# Patient Record
Sex: MALE | Employment: FULL TIME | URBAN - METROPOLITAN AREA
[De-identification: names, ages, dates, MRNs, and addresses within clinical notes are randomized per-mention and may not be internally consistent; named-entity substitution may affect disease eponyms.]

---

## 2024-10-27 ENCOUNTER — HOSPITAL ENCOUNTER (EMERGENCY)
Facility: HOSPITAL | Age: 19
End: 2024-10-28
Attending: EMERGENCY MEDICINE
Payer: COMMERCIAL

## 2024-10-27 DIAGNOSIS — Z00.8 MEDICAL CLEARANCE FOR PSYCHIATRIC ADMISSION: ICD-10-CM

## 2024-10-27 DIAGNOSIS — R45.850 HOMICIDAL IDEATION: Primary | ICD-10-CM

## 2024-10-27 LAB
ALBUMIN SERPL BCG-MCNC: 4.4 G/DL (ref 3.5–5)
ALP SERPL-CCNC: 94 U/L (ref 34–104)
ALT SERPL W P-5'-P-CCNC: 14 U/L (ref 7–52)
AMPHETAMINES SERPL QL SCN: NEGATIVE
ANION GAP SERPL CALCULATED.3IONS-SCNC: 6 MMOL/L (ref 4–13)
APAP SERPL-MCNC: <2 UG/ML (ref 10–20)
AST SERPL W P-5'-P-CCNC: 20 U/L (ref 13–39)
BARBITURATES UR QL: NEGATIVE
BASOPHILS # BLD AUTO: 0.03 THOUSANDS/ÂΜL (ref 0–0.1)
BASOPHILS NFR BLD AUTO: 0 % (ref 0–1)
BENZODIAZ UR QL: NEGATIVE
BILIRUB SERPL-MCNC: 1.28 MG/DL (ref 0.2–1)
BUN SERPL-MCNC: 10 MG/DL (ref 5–25)
CALCIUM SERPL-MCNC: 9.8 MG/DL (ref 8.4–10.2)
CHLORIDE SERPL-SCNC: 104 MMOL/L (ref 96–108)
CO2 SERPL-SCNC: 29 MMOL/L (ref 21–32)
COCAINE UR QL: NEGATIVE
CREAT SERPL-MCNC: 0.9 MG/DL (ref 0.6–1.3)
EOSINOPHIL # BLD AUTO: 0.11 THOUSAND/ÂΜL (ref 0–0.61)
EOSINOPHIL NFR BLD AUTO: 1 % (ref 0–6)
ERYTHROCYTE [DISTWIDTH] IN BLOOD BY AUTOMATED COUNT: 15.1 % (ref 11.6–15.1)
ETHANOL SERPL-MCNC: <10 MG/DL
FENTANYL UR QL SCN: NEGATIVE
GFR SERPL CREATININE-BSD FRML MDRD: 121 ML/MIN/1.73SQ M
GLUCOSE SERPL-MCNC: 109 MG/DL (ref 65–140)
HCT VFR BLD AUTO: 37.9 % (ref 36.5–49.3)
HGB BLD-MCNC: 13.6 G/DL (ref 12–17)
HYDROCODONE UR QL SCN: NEGATIVE
IMM GRANULOCYTES # BLD AUTO: 0.03 THOUSAND/UL (ref 0–0.2)
IMM GRANULOCYTES NFR BLD AUTO: 0 % (ref 0–2)
LYMPHOCYTES # BLD AUTO: 1.41 THOUSANDS/ÂΜL (ref 0.6–4.47)
LYMPHOCYTES NFR BLD AUTO: 17 % (ref 14–44)
MCH RBC QN AUTO: 29.9 PG (ref 26.8–34.3)
MCHC RBC AUTO-ENTMCNC: 35.9 G/DL (ref 31.4–37.4)
MCV RBC AUTO: 83 FL (ref 82–98)
METHADONE UR QL: NEGATIVE
MONOCYTES # BLD AUTO: 0.77 THOUSAND/ÂΜL (ref 0.17–1.22)
MONOCYTES NFR BLD AUTO: 9 % (ref 4–12)
NEUTROPHILS # BLD AUTO: 5.96 THOUSANDS/ÂΜL (ref 1.85–7.62)
NEUTS SEG NFR BLD AUTO: 73 % (ref 43–75)
NRBC BLD AUTO-RTO: 0 /100 WBCS
OPIATES UR QL SCN: NEGATIVE
OXYCODONE+OXYMORPHONE UR QL SCN: NEGATIVE
PCP UR QL: NEGATIVE
PLATELET # BLD AUTO: 129 THOUSANDS/UL (ref 149–390)
POTASSIUM SERPL-SCNC: 3.6 MMOL/L (ref 3.5–5.3)
PROT SERPL-MCNC: 6.7 G/DL (ref 6.4–8.4)
RBC # BLD AUTO: 4.55 MILLION/UL (ref 3.88–5.62)
SALICYLATES SERPL-MCNC: <5 MG/DL (ref 3–20)
SODIUM SERPL-SCNC: 139 MMOL/L (ref 135–147)
THC UR QL: NEGATIVE
WBC # BLD AUTO: 8.31 THOUSAND/UL (ref 4.31–10.16)

## 2024-10-27 PROCEDURE — 99285 EMERGENCY DEPT VISIT HI MDM: CPT

## 2024-10-27 PROCEDURE — 36415 COLL VENOUS BLD VENIPUNCTURE: CPT | Performed by: EMERGENCY MEDICINE

## 2024-10-27 PROCEDURE — 99285 EMERGENCY DEPT VISIT HI MDM: CPT | Performed by: EMERGENCY MEDICINE

## 2024-10-27 PROCEDURE — 80061 LIPID PANEL: CPT | Performed by: PSYCHIATRY & NEUROLOGY

## 2024-10-27 PROCEDURE — 84443 ASSAY THYROID STIM HORMONE: CPT | Performed by: PSYCHIATRY & NEUROLOGY

## 2024-10-27 PROCEDURE — 83036 HEMOGLOBIN GLYCOSYLATED A1C: CPT | Performed by: PSYCHIATRY & NEUROLOGY

## 2024-10-27 PROCEDURE — 80143 DRUG ASSAY ACETAMINOPHEN: CPT | Performed by: EMERGENCY MEDICINE

## 2024-10-27 PROCEDURE — 80307 DRUG TEST PRSMV CHEM ANLYZR: CPT | Performed by: EMERGENCY MEDICINE

## 2024-10-27 PROCEDURE — 80053 COMPREHEN METABOLIC PANEL: CPT | Performed by: EMERGENCY MEDICINE

## 2024-10-27 PROCEDURE — 80179 DRUG ASSAY SALICYLATE: CPT | Performed by: EMERGENCY MEDICINE

## 2024-10-27 PROCEDURE — 82077 ASSAY SPEC XCP UR&BREATH IA: CPT | Performed by: EMERGENCY MEDICINE

## 2024-10-27 PROCEDURE — 85025 COMPLETE CBC W/AUTO DIFF WBC: CPT | Performed by: EMERGENCY MEDICINE

## 2024-10-27 NOTE — LETTER
[facility] EMERGENCY DEPARTMENT  [address] [address]  Dept: [phone]      EMTALA TRANSFER CONSENT    NAME Cortez Du                                         2005                              MRN [id number]    I have been informed of my rights regarding examination, treatment, and transfer   by Dr. Isma Huff DO    Benefits: Specialized equipment and/or services available at the receiving facility (Include comment)________________________, Continuity of care, Other benefits (Include comment)_______________________ (inpatient mental health 201)    Risks: Potential for delay in receiving treatment, Potential deterioration of medical condition, Possible worsening of condition or death during transfer, Increased discomfort during transfer        Consent for Transfer:  I acknowledge that my medical condition has been evaluated and explained to me by the emergency department physician or other qualified medical person and/or my attending physician, who has recommended that I be transferred to the service of  Accepting Physician: Dr. Rogers at Accepting Facility Name, City & State : \[facility]\"" 2. The above potential benefits of such transfer, the potential risks associated with such transfer, and the probable risks of not being transferred have been explained to me, and I fully understand them.  The doctor has explained that, in my case, the benefits of transfer outweigh the risks.  I agree to be transferred.      I authorize the performance of emergency medical procedures and treatments upon me in both transit and upon arrival at the receiving facility.  Additionally, I authorize the release of any and all medical records to the receiving facility and request they be transported with me, if possible.  I understand that the safest mode of transportation during a medical emergency is an ambulance and that the Hospital advocates the use of this mode of  transport. Risks of traveling to the receiving facility by car, including absence of medical control, life sustaining equipment, such as oxygen, and medical personnel has been explained to me and I fully understand them.    (REINALDO CORRECT BOX BELOW)  [  ]  I consent to the stated transfer and to be transported by ambulance/helicopter.  [  ]  I consent to the stated transfer, but refuse transportation by ambulance and accept full responsibility for my transportation by car.  I understand the risks of non-ambulance transfers and I exonerate the Hospital and its staff from any deterioration in my condition that results from this refusal.    X___________________________________________    DATE  10/28/24  TIME________  Signature of patient or legally responsible individual signing on patient behalf           RELATIONSHIP TO PATIENT_________________________          Provider Certification    NAME Cortez Du                                        [facility] 2005                              MRN [id number]    A medical screening exam was performed on the above named patient.  Based on the examination:    Condition Necessitating Transfer The primary encounter diagnosis was Homicidal ideation. A diagnosis of Medical clearance for psychiatric admission was also pertinent to this visit.    Patient Condition: The patient has been stabilized such that within reasonable medical probability, no material deterioration of the patient condition or the condition of the unborn child(suyapa) is likely to result from the transfer    Reason for Transfer: Level of Care needed not available at this facility, No bed available at level of patient's needs, Other (Include comment)____________________ (inpatient mental health 201)    Transfer Requirements: Facility [facility] 2W   Space available and qualified personnel available for treatment as acknowledged by Crisis  Agreed to accept transfer and to provide appropriate medical treatment as acknowledged  by       Dr. Rogers  Appropriate medical records of the examination and treatment of the patient are provided at the time of transfer   STAFF INITIAL WHEN COMPLETED _______  Transfer will be performed by qualified personnel from [facility]  and appropriate transfer equipment as required, including the use of necessary and appropriate life support measures.    Provider Certification: I have examined the patient and explained the following risks and benefits of being transferred/refusing transfer to the patient/family:  General risk, such as traffic hazards, adverse weather conditions, rough terrain or turbulence, possible failure of equipment (including vehicle or aircraft), or consequences of actions of persons outside the control of the transport personnel, The possibility of a transport vehicle being unavailable, Unanticipated needs of medical equipment and personnel during transport, Risk of worsening condition, The patient is stable for psychiatric transfer because they are medically stable, and is protected from harming him/herself or others during transport      Based on these reasonable risks and benefits to the patient and/or the unborn child(suyapa), and based upon the information available at the time of the patient’s examination, I certify that the medical benefits reasonably to be expected from the provision of appropriate medical treatments at another medical facility outweigh the increasing risks, if any, to the individual’s medical condition, and in the case of labor to the unborn child, from effecting the transfer.    X____________________________________________ DATE 10/28/24        TIME_______      ORIGINAL - SEND TO MEDICAL RECORDS   COPY - SEND WITH PATIENT DURING TRANSFER

## 2024-10-28 ENCOUNTER — HOSPITAL ENCOUNTER (INPATIENT)
Facility: HOSPITAL | Age: 19
LOS: 4 days | Discharge: HOME | End: 2024-11-01
Attending: STUDENT IN AN ORGANIZED HEALTH CARE EDUCATION/TRAINING PROGRAM | Admitting: STUDENT IN AN ORGANIZED HEALTH CARE EDUCATION/TRAINING PROGRAM
Payer: COMMERCIAL

## 2024-10-28 VITALS
DIASTOLIC BLOOD PRESSURE: 69 MMHG | OXYGEN SATURATION: 98 % | WEIGHT: 138 LBS | BODY MASS INDEX: 22.99 KG/M2 | TEMPERATURE: 98.1 F | SYSTOLIC BLOOD PRESSURE: 123 MMHG | HEIGHT: 65 IN | RESPIRATION RATE: 18 BRPM | HEART RATE: 62 BPM

## 2024-10-28 DIAGNOSIS — Z00.8 MEDICAL CLEARANCE FOR PSYCHIATRIC ADMISSION: ICD-10-CM

## 2024-10-28 DIAGNOSIS — F60.9 PERSONALITY DISORDER, UNSPECIFIED (HCC): ICD-10-CM

## 2024-10-28 DIAGNOSIS — R45.850 HOMICIDAL IDEATION: Primary | ICD-10-CM

## 2024-10-28 LAB
EST. AVERAGE GLUCOSE BLD GHB EST-MCNC: 74 MG/DL
HBA1C MFR BLD: 4.2 %
TSH SERPL DL<=0.05 MIU/L-ACNC: 4.82 UIU/ML (ref 0.45–4.5)

## 2024-10-28 RX ORDER — BENZTROPINE MESYLATE 1 MG/1
1 TABLET ORAL
Status: DISCONTINUED | OUTPATIENT
Start: 2024-10-28 | End: 2024-11-01 | Stop reason: HOSPADM

## 2024-10-28 RX ORDER — OLANZAPINE 10 MG/2ML
2.5 INJECTION, POWDER, FOR SOLUTION INTRAMUSCULAR
Status: DISCONTINUED | OUTPATIENT
Start: 2024-10-28 | End: 2024-11-01 | Stop reason: HOSPADM

## 2024-10-28 RX ORDER — BISACODYL 10 MG
10 SUPPOSITORY, RECTAL RECTAL DAILY PRN
Status: CANCELLED | OUTPATIENT
Start: 2024-10-28

## 2024-10-28 RX ORDER — AMOXICILLIN 250 MG
1 CAPSULE ORAL DAILY PRN
Status: DISCONTINUED | OUTPATIENT
Start: 2024-10-28 | End: 2024-11-01 | Stop reason: HOSPADM

## 2024-10-28 RX ORDER — BISACODYL 10 MG
10 SUPPOSITORY, RECTAL RECTAL DAILY PRN
Status: DISCONTINUED | OUTPATIENT
Start: 2024-10-28 | End: 2024-11-01 | Stop reason: HOSPADM

## 2024-10-28 RX ORDER — BENZTROPINE MESYLATE 1 MG/ML
1 INJECTION, SOLUTION INTRAMUSCULAR; INTRAVENOUS
Status: DISCONTINUED | OUTPATIENT
Start: 2024-10-28 | End: 2024-11-01 | Stop reason: HOSPADM

## 2024-10-28 RX ORDER — AMOXICILLIN 250 MG
1 CAPSULE ORAL DAILY PRN
Status: CANCELLED | OUTPATIENT
Start: 2024-10-28

## 2024-10-28 RX ORDER — OLANZAPINE 2.5 MG/1
2.5 TABLET, FILM COATED ORAL
Status: CANCELLED | OUTPATIENT
Start: 2024-10-28

## 2024-10-28 RX ORDER — OLANZAPINE 5 MG/1
5 TABLET ORAL
Status: CANCELLED | OUTPATIENT
Start: 2024-10-28

## 2024-10-28 RX ORDER — HYDROXYZINE HYDROCHLORIDE 25 MG/1
100 TABLET, FILM COATED ORAL
Status: CANCELLED | OUTPATIENT
Start: 2024-10-28

## 2024-10-28 RX ORDER — MAGNESIUM HYDROXIDE/ALUMINUM HYDROXICE/SIMETHICONE 120; 1200; 1200 MG/30ML; MG/30ML; MG/30ML
30 SUSPENSION ORAL EVERY 4 HOURS PRN
Status: CANCELLED | OUTPATIENT
Start: 2024-10-28

## 2024-10-28 RX ORDER — ACETAMINOPHEN 325 MG/1
975 TABLET ORAL EVERY 6 HOURS PRN
Status: DISCONTINUED | OUTPATIENT
Start: 2024-10-28 | End: 2024-11-01 | Stop reason: HOSPADM

## 2024-10-28 RX ORDER — HYDROXYZINE HYDROCHLORIDE 50 MG/1
100 TABLET, FILM COATED ORAL
Status: DISCONTINUED | OUTPATIENT
Start: 2024-10-28 | End: 2024-11-01 | Stop reason: HOSPADM

## 2024-10-28 RX ORDER — POLYETHYLENE GLYCOL 3350 17 G/17G
17 POWDER, FOR SOLUTION ORAL DAILY PRN
Status: DISCONTINUED | OUTPATIENT
Start: 2024-10-28 | End: 2024-11-01 | Stop reason: HOSPADM

## 2024-10-28 RX ORDER — OLANZAPINE 10 MG/2ML
5 INJECTION, POWDER, FOR SOLUTION INTRAMUSCULAR
Status: DISCONTINUED | OUTPATIENT
Start: 2024-10-28 | End: 2024-11-01 | Stop reason: HOSPADM

## 2024-10-28 RX ORDER — OLANZAPINE 10 MG/2ML
2.5 INJECTION, POWDER, FOR SOLUTION INTRAMUSCULAR
Status: CANCELLED | OUTPATIENT
Start: 2024-10-28

## 2024-10-28 RX ORDER — PROPRANOLOL HCL 20 MG
10 TABLET ORAL EVERY 8 HOURS PRN
Status: CANCELLED | OUTPATIENT
Start: 2024-10-28

## 2024-10-28 RX ORDER — POLYETHYLENE GLYCOL 3350 17 G/17G
17 POWDER, FOR SOLUTION ORAL DAILY PRN
Status: CANCELLED | OUTPATIENT
Start: 2024-10-28

## 2024-10-28 RX ORDER — HYDROXYZINE HYDROCHLORIDE 50 MG/1
50 TABLET, FILM COATED ORAL
Status: DISCONTINUED | OUTPATIENT
Start: 2024-10-28 | End: 2024-11-01 | Stop reason: HOSPADM

## 2024-10-28 RX ORDER — PROPRANOLOL HYDROCHLORIDE 10 MG/1
10 TABLET ORAL EVERY 8 HOURS PRN
Status: DISCONTINUED | OUTPATIENT
Start: 2024-10-28 | End: 2024-11-01 | Stop reason: HOSPADM

## 2024-10-28 RX ORDER — OLANZAPINE 5 MG/1
5 TABLET ORAL
Status: DISCONTINUED | OUTPATIENT
Start: 2024-10-28 | End: 2024-11-01 | Stop reason: HOSPADM

## 2024-10-28 RX ORDER — LORAZEPAM 2 MG/ML
2 INJECTION INTRAMUSCULAR EVERY 6 HOURS PRN
Status: DISCONTINUED | OUTPATIENT
Start: 2024-10-28 | End: 2024-11-01 | Stop reason: HOSPADM

## 2024-10-28 RX ORDER — ACETAMINOPHEN 325 MG/1
975 TABLET ORAL EVERY 6 HOURS PRN
Status: CANCELLED | OUTPATIENT
Start: 2024-10-28

## 2024-10-28 RX ORDER — OLANZAPINE 2.5 MG/1
2.5 TABLET, FILM COATED ORAL
Status: DISCONTINUED | OUTPATIENT
Start: 2024-10-28 | End: 2024-11-01 | Stop reason: HOSPADM

## 2024-10-28 RX ORDER — HYDROXYZINE HYDROCHLORIDE 25 MG/1
25 TABLET, FILM COATED ORAL
Status: DISCONTINUED | OUTPATIENT
Start: 2024-10-28 | End: 2024-11-01 | Stop reason: HOSPADM

## 2024-10-28 RX ORDER — HYDROXYZINE HYDROCHLORIDE 25 MG/1
50 TABLET, FILM COATED ORAL
Status: CANCELLED | OUTPATIENT
Start: 2024-10-28

## 2024-10-28 RX ORDER — LANOLIN ALCOHOL/MO/W.PET/CERES
3 CREAM (GRAM) TOPICAL
Status: DISCONTINUED | OUTPATIENT
Start: 2024-10-28 | End: 2024-11-01 | Stop reason: HOSPADM

## 2024-10-28 RX ORDER — DIPHENHYDRAMINE HYDROCHLORIDE 50 MG/ML
50 INJECTION INTRAMUSCULAR; INTRAVENOUS EVERY 6 HOURS PRN
Status: DISCONTINUED | OUTPATIENT
Start: 2024-10-28 | End: 2024-11-01 | Stop reason: HOSPADM

## 2024-10-28 RX ORDER — BENZTROPINE MESYLATE 1 MG/ML
1 INJECTION, SOLUTION INTRAMUSCULAR; INTRAVENOUS
Status: CANCELLED | OUTPATIENT
Start: 2024-10-28

## 2024-10-28 RX ORDER — OLANZAPINE 10 MG/2ML
5 INJECTION, POWDER, FOR SOLUTION INTRAMUSCULAR
Status: CANCELLED | OUTPATIENT
Start: 2024-10-28

## 2024-10-28 RX ORDER — MAGNESIUM HYDROXIDE/ALUMINUM HYDROXICE/SIMETHICONE 120; 1200; 1200 MG/30ML; MG/30ML; MG/30ML
30 SUSPENSION ORAL EVERY 4 HOURS PRN
Status: DISCONTINUED | OUTPATIENT
Start: 2024-10-28 | End: 2024-11-01 | Stop reason: HOSPADM

## 2024-10-28 RX ORDER — DIPHENHYDRAMINE HYDROCHLORIDE 50 MG/ML
50 INJECTION INTRAMUSCULAR; INTRAVENOUS EVERY 6 HOURS PRN
Status: CANCELLED | OUTPATIENT
Start: 2024-10-28

## 2024-10-28 RX ORDER — ACETAMINOPHEN 325 MG/1
650 TABLET ORAL EVERY 6 HOURS PRN
Status: DISCONTINUED | OUTPATIENT
Start: 2024-10-28 | End: 2024-11-01 | Stop reason: HOSPADM

## 2024-10-28 RX ORDER — ACETAMINOPHEN 325 MG/1
650 TABLET ORAL EVERY 4 HOURS PRN
Status: CANCELLED | OUTPATIENT
Start: 2024-10-28

## 2024-10-28 RX ORDER — HYDROXYZINE HYDROCHLORIDE 25 MG/1
25 TABLET, FILM COATED ORAL
Status: CANCELLED | OUTPATIENT
Start: 2024-10-28

## 2024-10-28 RX ORDER — ACETAMINOPHEN 325 MG/1
650 TABLET ORAL EVERY 6 HOURS PRN
Status: CANCELLED | OUTPATIENT
Start: 2024-10-28

## 2024-10-28 RX ORDER — ACETAMINOPHEN 325 MG/1
650 TABLET ORAL EVERY 4 HOURS PRN
Status: DISCONTINUED | OUTPATIENT
Start: 2024-10-28 | End: 2024-11-01 | Stop reason: HOSPADM

## 2024-10-28 RX ORDER — BENZTROPINE MESYLATE 0.5 MG/1
1 TABLET ORAL
Status: CANCELLED | OUTPATIENT
Start: 2024-10-28

## 2024-10-28 RX ORDER — LORAZEPAM 2 MG/ML
2 INJECTION INTRAMUSCULAR EVERY 6 HOURS PRN
Status: CANCELLED | OUTPATIENT
Start: 2024-10-28

## 2024-10-28 NOTE — ED NOTES
Patient changed into paper scrubs and belongings removed. Belongings placed in  locker 1.      Petrona Soliman RN  10/27/24 7892

## 2024-10-28 NOTE — ED NOTES
Confirmed receipt of referral by Intake.  Updated [facility].  Awaiting bed availability at present.

## 2024-10-28 NOTE — ED NOTES
Call to [facility].  Phone: [phone].  Spoke with Yokasta MAHER, who verified effective 8/15/2024, but added that member has no mental health coverage or benefits under this plan.  Reference # for the call: [id number].    Patient appears to have no mental health coverage.

## 2024-10-28 NOTE — PROGRESS NOTES
-Camo Pants w/ strings  -Vans  -Black Jacket  -Black T-shirt  -Underwear  -Socks  -Cellphone  -Metal wallet  -$23.00  -[place] ([id number])  -(2) [employer] ([id number]  -[place] ID ([id number])  -[employer] ID ([id number])  -[employer] ([id number])  -[employer] ([id number])  -Firearm License  -[employer] ([id number])  -(2) [facility] ([id number])    Bedside Belongings  -Black T-shirt  -Socks  -Underwear

## 2024-10-28 NOTE — ED NOTES
Insurance Authorization for admission:   Primary payor is [employer].  Attempts were made to gain provider access for online submission, but denied.  3 forms were located (one general authorization request, one [facility], and one [facility]).  All 3 forms completed and submitted with clinical via fax as the forms indicated to: [phone] and [phone].  Successful transmission confirmed via fax generated report.  Forms, confirmation and clinical that was attached all included in transfer packet for UR referencing as needed.  Days approved: TBD.  Level of care: inpatient mental health 201.  Review on: TBD.   Authorization #: [id number].
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
## 2024-10-28 NOTE — ED NOTES
Patient was advised of final disposition and plan and voiced understanding.  Patient signed EMTALA.  Requested a call to his mother for update.  Call to mother, who was provided unit details and voiced understanding.  [facility] also updated.

## 2024-10-28 NOTE — ED NOTES
"Crisis received a fax from [employer] indicating that they were \"unable to process this request due to the beneficiary being ineligible.\"  Number indicated on the form for contact: [phone].  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  He also stated that if patient has other health insurance, they would be the secondary payor, and suggested seeking prior authorization with [employer], as he is currently ineligible for [employer].  He offered a control # for the call: [id number].    There is no [employer] card on file and no ID# indicated in the chart / facesheet.  Crisis to inquire with patient.  "

## 2024-10-28 NOTE — ED NOTES
Per chart, patient indicated having [employer] card in his possessions.  Card accessed in his wallet, copied, and provided to Registration. Insurance was run multiple times and returned as E-rejected every time.

## 2024-10-28 NOTE — ED NOTES
Patient is accepted at [facility] 2W.  Patient is accepted by Dr. Rogers with orders by Nerissa Hills per Kenna.     Transportation is arranged with [facility].     Transportation is scheduled for 1230 with [facility].  Intake updated.   Patient may go to the floor pending requested  time of 1230 or later.      Transfer packet prepared and will be on chart with copies once signed and complete.        Nurse report is to be called to [phone] prior to patient transfer.

## 2024-10-28 NOTE — ED NOTES
Crisis met with patient to discuss placement options.  Patient has no admission history.  After review of options in brief, he did express preference to wait for network bed availability before expanding the bed search.  Crisis to inquire on bed availability with Intake later this morning.

## 2024-10-28 NOTE — PLAN OF CARE
Problem: DEPRESSION  Goal: Will be euthymic at discharge  Description: INTERVENTIONS:  - Administer medication as ordered  - Provide emotional support via 1:1 interaction with staff  - Encourage involvement in milieu/groups/activities  - Monitor for social isolation  Outcome: Progressing     Problem: BEHAVIOR  Goal: Pt/Family maintain appropriate behavior and adhere to behavioral management agreement, if implemented  Description: INTERVENTIONS:  - Assess the family dynamic   - Encourage verbalization of thoughts and concerns in a socially appropriate manner  - Assess patient/family's coping skills and non-compliant behavior (including use of illegal substances).  - Utilize positive, consistent limit setting strategies supporting safety of patient, staff and others  - Initiate consult with Case Management, Spiritual Care or other ancillary services as appropriate  - If a patient's/visitor's behavior jeopardizes the safety of the patient, staff, or others, refer to organization procedure.   - Notify Security of behavior or suspected illegal substances which indicate the need for search of the patient and/or belongings  - Encourage participation in the decision making process about a behavioral management agreement; implement if patient meets criteria  Outcome: Progressing     Problem: ANXIETY  Goal: Will report anxiety at manageable levels  Description: INTERVENTIONS:  - Administer medication as ordered  - Teach and encourage coping skills  - Provide emotional support  - Assess patient/family for anxiety and ability to cope  Outcome: Progressing  Goal: By discharge: Patient will verbalize 2 strategies to deal with anxiety  Description: Interventions:  - Identify any obvious source/trigger to anxiety  - Staff will assist patient in applying identified coping technique/skills  - Encourage attendance of scheduled groups and activities  Outcome: Progressing

## 2024-10-29 PROBLEM — R45.850 HOMICIDAL IDEATION: Status: ACTIVE | Noted: 2024-10-29

## 2024-10-29 PROBLEM — F60.9 PERSONALITY DISORDER, UNSPECIFIED (HCC): Status: ACTIVE | Noted: 2024-10-29

## 2024-10-29 PROBLEM — Z00.8 MEDICAL CLEARANCE FOR PSYCHIATRIC ADMISSION: Status: ACTIVE | Noted: 2024-10-29

## 2024-10-29 PROBLEM — R79.89 ELEVATED TSH: Status: ACTIVE | Noted: 2024-10-29

## 2024-10-29 LAB
25(OH)D3 SERPL-MCNC: 35 NG/ML (ref 30–100)
ANISOCYTOSIS BLD QL SMEAR: PRESENT
ATRIAL RATE: 51 BPM
BASOPHILS # BLD MANUAL: 0 THOUSAND/UL (ref 0–0.1)
BASOPHILS NFR MAR MANUAL: 0 % (ref 0–1)
CHOLEST SERPL-MCNC: 109 MG/DL
EOSINOPHIL # BLD MANUAL: 0.19 THOUSAND/UL (ref 0–0.4)
EOSINOPHIL NFR BLD MANUAL: 3 % (ref 0–6)
ERYTHROCYTE [DISTWIDTH] IN BLOOD BY AUTOMATED COUNT: 15.5 % (ref 11.6–15.1)
FOLATE SERPL-MCNC: 13.5 NG/ML
HCT VFR BLD AUTO: 39.4 % (ref 36.5–49.3)
HDLC SERPL-MCNC: 41 MG/DL
HGB BLD-MCNC: 13.7 G/DL (ref 12–17)
LDLC SERPL CALC-MCNC: 60 MG/DL (ref 0–100)
LYMPHOCYTES # BLD AUTO: 2.23 THOUSAND/UL (ref 0.6–4.47)
LYMPHOCYTES # BLD AUTO: 36 % (ref 14–44)
MCH RBC QN AUTO: 29.8 PG (ref 26.8–34.3)
MCHC RBC AUTO-ENTMCNC: 34.8 G/DL (ref 31.4–37.4)
MCV RBC AUTO: 86 FL (ref 82–98)
MONOCYTES # BLD AUTO: 0.25 THOUSAND/UL (ref 0–1.22)
MONOCYTES NFR BLD: 4 % (ref 4–12)
NEUTROPHILS # BLD MANUAL: 3.53 THOUSAND/UL (ref 1.85–7.62)
NEUTS SEG NFR BLD AUTO: 57 % (ref 43–75)
NONHDLC SERPL-MCNC: 68 MG/DL
P AXIS: 40 DEGREES
PLATELET # BLD AUTO: 117 THOUSANDS/UL (ref 149–390)
PLATELET BLD QL SMEAR: ABNORMAL
PMV BLD AUTO: 13.9 FL (ref 8.9–12.7)
PR INTERVAL: 120 MS
QRS AXIS: 98 DEGREES
QRSD INTERVAL: 92 MS
QT INTERVAL: 408 MS
QTC INTERVAL: 376 MS
RBC # BLD AUTO: 4.6 MILLION/UL (ref 3.88–5.62)
RBC MORPH BLD: PRESENT
T WAVE AXIS: 34 DEGREES
T4 FREE SERPL-MCNC: 0.8 NG/DL (ref 0.61–1.12)
TRIGL SERPL-MCNC: 39 MG/DL
VENTRICULAR RATE: 51 BPM
VIT B12 SERPL-MCNC: 356 PG/ML (ref 180–914)
WBC # BLD AUTO: 6.19 THOUSAND/UL (ref 4.31–10.16)

## 2024-10-29 PROCEDURE — 85007 BL SMEAR W/DIFF WBC COUNT: CPT | Performed by: PSYCHIATRY & NEUROLOGY

## 2024-10-29 PROCEDURE — 99223 1ST HOSP IP/OBS HIGH 75: CPT | Performed by: PSYCHIATRY & NEUROLOGY

## 2024-10-29 PROCEDURE — 99253 IP/OBS CNSLTJ NEW/EST LOW 45: CPT

## 2024-10-29 PROCEDURE — 82306 VITAMIN D 25 HYDROXY: CPT | Performed by: STUDENT IN AN ORGANIZED HEALTH CARE EDUCATION/TRAINING PROGRAM

## 2024-10-29 PROCEDURE — 93010 ELECTROCARDIOGRAM REPORT: CPT | Performed by: INTERNAL MEDICINE

## 2024-10-29 PROCEDURE — 82746 ASSAY OF FOLIC ACID SERUM: CPT | Performed by: STUDENT IN AN ORGANIZED HEALTH CARE EDUCATION/TRAINING PROGRAM

## 2024-10-29 PROCEDURE — 93005 ELECTROCARDIOGRAM TRACING: CPT

## 2024-10-29 PROCEDURE — 84439 ASSAY OF FREE THYROXINE: CPT | Performed by: STUDENT IN AN ORGANIZED HEALTH CARE EDUCATION/TRAINING PROGRAM

## 2024-10-29 PROCEDURE — 85027 COMPLETE CBC AUTOMATED: CPT | Performed by: PSYCHIATRY & NEUROLOGY

## 2024-10-29 PROCEDURE — 82607 VITAMIN B-12: CPT | Performed by: STUDENT IN AN ORGANIZED HEALTH CARE EDUCATION/TRAINING PROGRAM

## 2024-10-29 NOTE — CONSULTS
Consultation - Hospitalist   Name: Cortez Du 19 y.o. male I MRN: [id number]  Unit/Bed#: U 211-02 I Date of Admission: 10/28/2024   Date of Service: 10/29/2024 I Hospital Day: 1   Inpatient consult for Medical Clearance for  patient  Consult performed by: Shannon Montenegro PA-C  Consult ordered by: Nerissa Hills PA-C        Physician Requesting Evaluation: Barbie Bailey*   Reason for Evaluation / Principal Problem: Medical clearance for psychiatric admission     Assessment & Plan  Medical clearance for psychiatric admission  Admission labs reviewed, CBC, CMP, lipid panel acceptable   Vitamin levels, T4, pending  Vitals stable   UDS negative  EKG: NSR,   Medically stable for continued inpatient psychiatric treatment based on available results   Elevated TSH  TSH elevated at 4.816  T4 pending  Repeat levels with PCP in 4-6 weeks outpatient   No hx of thyroid dx      Counseling / Coordination of Care Time: 30 minutes.  Greater than 50% of total time spent on patient counseling and coordination of care.    Collaboration of Care: Were Recommendations Directly Discussed with Primary Treatment Team? - No     History of Present Illness:    Cortez Du is a 19 y.o. male without significant PMH who is originally admitted to the psychiatry service due to 302 filed after patient reported thoughts of HI. We are consulted for medical clearance for admission to Behavioral Health Unit and treatment of underlying psychiatric illness.      Patient denies any substance use. He does not take medications and denies known medical hx. He currently denies any physical complaints including fevers, chills, chest pain, cough, sore throat, N/V, abdominal pain, or other sx. Labs and vitals stable. Based on all available data patient appears medically stable to continue inpatient psychiatric treatment.     Review of Systems:    Review of Systems   Constitutional:  Negative for chills, fatigue and fever.  "  HENT:  Negative for congestion, rhinorrhea and sore throat.    Eyes:  Negative for visual disturbance.   Respiratory:  Negative for cough, chest tightness, shortness of breath and wheezing.    Cardiovascular:  Negative for chest pain, palpitations and leg swelling.   Gastrointestinal:  Negative for abdominal pain, constipation, diarrhea, nausea and vomiting.   Genitourinary:  Negative for difficulty urinating, dysuria and frequency.   Musculoskeletal:  Negative for arthralgias and myalgias.   Skin:  Negative for rash and wound.   Neurological:  Negative for dizziness, light-headedness and headaches.   Psychiatric/Behavioral:          HI   All other systems reviewed and are negative.       Past Medical and Surgical History:     History reviewed. No pertinent past medical history.    No past surgical history on file.    Meds/Allergies:    PTA meds:   None       Allergies: No Known Allergies    Social History:     Marital Status: Single    Substance Use History:   Social History     Substance and Sexual Activity   Alcohol Use Never     Social History     Tobacco Use   Smoking Status Never   Smokeless Tobacco Never     Social History     Substance and Sexual Activity   Drug Use Never       Family History:    Family history non-contributory    Physical Exam:     Vitals:   Blood Pressure: 113/62 (10/29/24 0823)  Pulse: 55 (10/29/24 0823)  Temperature: (!) 96.9 °F (36.1 °C) (10/29/24 0823)  Temp Source: Tympanic (10/29/24 0823)  Respirations: 17 (10/29/24 0823)  Height: 5' 5\" (165.1 cm) (10/28/24 1338)  Weight - Scale: 65.3 kg (144 lb) (10/28/24 1338)  SpO2: 100 % (10/29/24 0823)    Physical Exam  Vitals and nursing note reviewed.   Constitutional:       General: He is not in acute distress.  HENT:      Head: Normocephalic and atraumatic.   Eyes:      General:         Right eye: No discharge.         Left eye: No discharge.      Extraocular Movements: Extraocular movements intact.      Conjunctiva/sclera: Conjunctivae " normal.   Cardiovascular:      Rate and Rhythm: Normal rate and regular rhythm.      Heart sounds: Normal heart sounds. No murmur heard.  Pulmonary:      Effort: Pulmonary effort is normal. No respiratory distress.      Breath sounds: Normal breath sounds. No wheezing, rhonchi or rales.   Abdominal:      General: Bowel sounds are normal.      Palpations: Abdomen is soft.      Tenderness: There is no abdominal tenderness. There is no guarding.   Musculoskeletal:      Right lower leg: No edema.      Left lower leg: No edema.   Skin:     General: Skin is warm and dry.   Neurological:      General: No focal deficit present.      Mental Status: He is alert and oriented to person, place, and time. Mental status is at baseline.      Cranial Nerves: No cranial nerve deficit.   Psychiatric:         Mood and Affect: Mood normal.         Behavior: Behavior normal.         Additional Data:     Lab Results: Results Review Statement: No pertinent imaging studies reviewed.    Results from last 7 days   Lab Units 10/29/24  0617 10/27/24  2246   WBC Thousand/uL 6.19 8.31   HEMOGLOBIN g/dL 13.7 13.6   HEMATOCRIT % 39.4 37.9   PLATELETS Thousands/uL 117* 129*   SEGS PCT %  --  73   LYMPHO PCT % 36 17   MONO PCT % 4 9   EOS PCT % 3 1     Results from last 7 days   Lab Units 10/27/24  2246   SODIUM mmol/L 139   POTASSIUM mmol/L 3.6   CHLORIDE mmol/L 104   CO2 mmol/L 29   BUN mg/dL 10   CREATININE mg/dL 0.90   ANION GAP mmol/L 6   CALCIUM mg/dL 9.8   ALBUMIN g/dL 4.4   TOTAL BILIRUBIN mg/dL 1.28*   ALK PHOS U/L 94   ALT U/L 14   AST U/L 20   GLUCOSE RANDOM mg/dL 109             Lab Results   Component Value Date/Time    HGBA1C 4.2 10/27/2024 10:46 PM           EKG, Pathology, and Other Studies Reviewed on Admission:   EKG - sinus bradycardia, HR 51,     ** Please Note: This note has been constructed using a voice recognition system. **

## 2024-10-29 NOTE — DISCHARGE INSTR - OTHER ORDERS
[facility] CRISIS INFORMATION   The PEER LINE is a toll-free telephone number for people in [facility] who are seeking a listening ear for additional support in their recovery from mental illness.  The PEER LINE is peer-run and peer-friendly.   You can call the Peer Line 24 hours a day. Phone: 6-508-PH-PEERS /(1-643.577.6144)   Emergency Services  [facility] Emergency Services: [phone]  [address] [place] [address]     Text CONNECT to 486700 from anywhere in the [place], anytime, about any type of crisis.  A live, trained Crisis Counselor receives the text and lets you know that they are here to listen.  The volunteer Crisis Counselor will help you move from a hot moment to a cool moment.    Warm Line: [phone], [phone], [phone]  If it is not quite a crisis, but you want to talk to someone, 24 hours/day, 7 days/week:  Someone to listen; someone who cares.    The [employer]ness (NOREEN) offers various education & support groups for you & your family.  For more information visit their website at   http://www.noreen-lv.org/.    Dial 2-1-1 to get connected/get help.  Free, confidential information & referral available 24/7: Aging Services, Child & Youth Services, Counseling, Education/Training, Food/Shelter/Clothing, Health Services, Parenting, Substance Abuse, Support Groups, Volunteer Opportunities, & much more.  Phone: 2-1-1 or 739-316-4001, Web: [email or web address], Email: [email or web address]
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
## 2024-10-29 NOTE — ASSESSMENT & PLAN NOTE
TSH elevated at 4.816  T4 pending  Repeat levels with PCP in 4-6 weeks outpatient   No hx of thyroid dx

## 2024-10-29 NOTE — DISCHARGE INSTR - APPOINTMENTS
You will be discharged to [address] [facility] [place] [address]   You confirmed that your cell phone number is [phone]          Behavioral Health Nurse Navigator, Toshia or Mila will be calling you after your discharge, on the phone number that you provided.  They will be available as an additional support, if needed.   If you wish to speak with Toshia, you may contact her at [phone].

## 2024-10-29 NOTE — PROGRESS NOTES
10/29/24 0832   Team Meeting   Meeting Type Daily Rounds   Team Members Present   Team Members Present Physician;Nurse;;Other (Discipline and Name)   Physician Team Member Dr. Shaikh / Dr. Kim / JEYSON Miller / PA Student   Nursing Team Member Ariel / Jamaal   Care Management Team Member Dai / Mary / Sarah / Levy   Other (Discipline and Name) Sigmund - Group Facilitator   Patient/Family Present   Patient Present No   Patient's Family Present No     Treatment Team Rounds Completed  Medical and Psychiatric Review Completed  D/C: Next week (?) New admission from [place] ED. Pt is a [employer] and reportedly was with his Commanding officer when he made HI comments. Pt reported that he sometimes says things to get a reaction from people.
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
## 2024-10-29 NOTE — NURSING NOTE
"Cortez is calm upon approach, appears anxious, but denies to this writer. Patient took an excessively long shower this evening, verbalized understanding r/t rounding BHT needing to open the door to visualize patient for safety. Cortez denies current SI/HI/AVH, states, \"No, no, none of that, not right now.\" Patient did attend evening group with encouragement and has been observed socializing with room-mate and select peers on the unit. Cortez denies any questions and/or concerns at this time. Staff availability reinforced.   "

## 2024-10-29 NOTE — TREATMENT PLAN
TREATMENT PLAN REVIEW - Behavioral Health Cortez Du 19 y.o. 2005 male MRN: [id number]    [facility] QU IP BEHAVIORAL HLTH Room / Bed: [facility] 211/[facility] 211-02 Encounter: [id number]          Admit Date/Time:  10/28/2024  1:36 PM    Treatment Team:   MD Zabrina Flores, REBECCA Garrido, REBECCA Arias, REBECCA Bush, REBECCA Ruggiero, REBECCA Dao RN    Diagnosis: Principal Problem:    Homicidal ideation  Active Problems:    Medical clearance for psychiatric admission    Elevated TSH    Personality disorder, unspecified (HCC)      Patient Strengths/Assets: cooperative, good physical health, negotiates basic needs, patient is on a voluntary commitment    Patient Barriers/Limitations: difficulty adapting, limited support system    Short Term Goals: decrease in homicidal thoughts    Long Term Goals: free of homicidal thoughts    Progress Towards Goals: attends groups    Recommended Treatment: medication management, patient medication education, group therapy, milieu therapy, continued Behavioral Health psychiatric evaluation/assessment process    Treatment Frequency: daily medication monitoring, group and milieu therapy daily, monitoring through interdisciplinary rounds, monitoring through weekly patient care conferences    Expected Discharge Date:  3-4 midnights      Discharge Plan: referrals as indicated    Treatment Plan Created/Updated By: Paul Kim DO

## 2024-10-29 NOTE — PROGRESS NOTES
Diagnosis of Homicidal ideation and Personality disorder, unspecified reviewed.   Short term goals for decrease in homicidal thoughts discussed.   All present parties in agreement and treatment plan signed.    10/30/24 4655   Team Meeting   Meeting Type Tx Team Meeting   Team Members Present   Team Members Present Physician;Nurse;   Physician Team Member Dr. Kim   Nursing Team Member Yue   Care Management Team Member Dai   Patient/Family Present   Patient Present No    Patient's Family Present No
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
## 2024-10-29 NOTE — PLAN OF CARE
Problem: DEPRESSION  Goal: Will be euthymic at discharge  Description: INTERVENTIONS:  - Administer medication as ordered  - Provide emotional support via 1:1 interaction with staff  - Encourage involvement in milieu/groups/activities  - Monitor for social isolation  Outcome: Progressing     Problem: BEHAVIOR  Goal: Pt/Family maintain appropriate behavior and adhere to behavioral management agreement, if implemented  Description: INTERVENTIONS:  - Assess the family dynamic   - Encourage verbalization of thoughts and concerns in a socially appropriate manner  - Assess patient/family's coping skills and non-compliant behavior (including use of illegal substances).  - Utilize positive, consistent limit setting strategies supporting safety of patient, staff and others  - Initiate consult with Case Management, Spiritual Care or other ancillary services as appropriate  - If a patient's/visitor's behavior jeopardizes the safety of the patient, staff, or others, refer to organization procedure.   - Notify Security of behavior or suspected illegal substances which indicate the need for search of the patient and/or belongings  - Encourage participation in the decision making process about a behavioral management agreement; implement if patient meets criteria  Outcome: Progressing     Problem: ANXIETY  Goal: Will report anxiety at manageable levels  Description: INTERVENTIONS:  - Administer medication as ordered  - Teach and encourage coping skills  - Provide emotional support  - Assess patient/family for anxiety and ability to cope  Outcome: Progressing  Goal: By discharge: Patient will verbalize 2 strategies to deal with anxiety  Description: Interventions:  - Identify any obvious source/trigger to anxiety  - Staff will assist patient in applying identified coping technique/skills  - Encourage attendance of scheduled groups and activities  Outcome: Not Progressing     Problem: DISCHARGE PLANNING  Goal: Discharge to home or  other facility with appropriate resources  Description: INTERVENTIONS:  - Identify barriers to discharge w/patient and caregiver  - Arrange for needed discharge resources and transportation as appropriate  - Identify discharge learning needs (meds, wound care, etc.)  - Arrange for interpretive services to assist at discharge as needed  - Refer to Case Management Department for coordinating discharge planning if the patient needs post-hospital services based on physician/advanced practitioner order or complex needs related to functional status, cognitive ability, or social support system  Outcome: Progressing

## 2024-10-29 NOTE — ASSESSMENT & PLAN NOTE
Admission labs reviewed, CBC, CMP, lipid panel acceptable   Vitamin levels, T4, pending  Vitals stable   UDS negative  EKG: NSR,   Medically stable for continued inpatient psychiatric treatment based on available results

## 2024-10-29 NOTE — NURSING NOTE
"Cortez is withdrawn to his room and social with his roommate. He states, \"I try to stay away from people so I don't have any problems.\" Currently denies HI and states, \"I only felt it once since getting here.\" Denies SI/AVH. Reports feeling ready for discharge and \"feeling trapped here.\" Encouraged to come to staff with questions or concerns.   "

## 2024-10-30 PROCEDURE — 99233 SBSQ HOSP IP/OBS HIGH 50: CPT | Performed by: PHYSICIAN ASSISTANT

## 2024-10-30 NOTE — NURSING NOTE
Pt pleasant during assessment. Pt reports yesterday having thoughts to choke others or hit others with an object. Pt reports having these thoughts about people who piss him off. Pt reports today not having any of these thoughts so far. Pt reports some anxiety. Denies depression. Pt attends groups. Pt reports wanting to go home so he can work and pay off his phone. Pt denies SI/HI/AVH. Pt reports sleeping and eating well.

## 2024-10-30 NOTE — NURSING NOTE
Pt continues to be withdrawn in his room this evening. Calm and cooperative during assessment, however, scant during conversation. Pt denies SI/HI/AVH currently. Pt denies any unmet needs or concerns at this time.
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
## 2024-10-30 NOTE — PROGRESS NOTES
10/30/24 0752   Team Meeting   Meeting Type Daily Rounds   Team Members Present   Team Members Present Physician;Nurse;;Other (Discipline and Name)   Physician Team Member Dr. Kim / JEYSON Miller / JEYSON Hills / PA Student   Nursing Team Member Ariel / Jamaal   Care Management Team Member Dai / Mary / Sarah   Other (Discipline and Name) Sigmund - Group Facilitator   Patient/Family Present   Patient Present No   Patient's Family Present No     Treatment Team Rounds Completed  Medical and Psychiatric Review Completed  D/C: Thursday/Friday (?) pt withdrawn to room, denies SI/HI. Reported he feels ready for dc. Pt not taking any medications. Wants therapy.

## 2024-10-30 NOTE — ASSESSMENT & PLAN NOTE
Nonspecific, not directed at anyone in particular, likely in the context of possible antisocial personality disorder.  Fully comprehends the difference between right and wrong.  States that if he were to hypothetically cause physical harm to another person, that he understands what he did would be illegal.  He says he would then take steps to avoid being caught or avoid consequences for his actions, which is suggestive of competency.

## 2024-10-31 ENCOUNTER — TELEPHONE (OUTPATIENT)
Age: 19
End: 2024-10-31

## 2024-10-31 PROBLEM — Z00.8 MEDICAL CLEARANCE FOR PSYCHIATRIC ADMISSION: Status: RESOLVED | Noted: 2024-10-29 | Resolved: 2024-10-31

## 2024-10-31 PROCEDURE — 99232 SBSQ HOSP IP/OBS MODERATE 35: CPT | Performed by: PHYSICIAN ASSISTANT

## 2024-10-31 NOTE — TELEPHONE ENCOUNTER
CM declined placing pt on TT waitlist and advised she will look elsewhere for pt. Writer closed referral.

## 2024-10-31 NOTE — NURSING NOTE
Pt is visible on unit, social with peers and attending groups.  Expresses readiness for discharge tomorrow.  Denies SI/AVH.  Denies HI currently.  Reports improved mood since admission.  Pt plans to attend OP therapy.

## 2024-10-31 NOTE — ASSESSMENT & PLAN NOTE
Nonspecific, not directed at anyone in particular, likely in the context of possible antisocial personality disorder.  Fully comprehends the difference between right and wrong.  States that if he were to hypothetically cause physical harm to another person, that he understands what he did would be illegal.  He says he would then take steps to avoid being caught or avoid consequences for his actions, which is suggestive of legal competency.

## 2024-10-31 NOTE — TELEPHONE ENCOUNTER
"----- Message from Becca KIRK sent at 10/31/2024  9:09 AM EDT -----  Good morning,     I am just sending a follow up message regarding a ASAP Therapy referral I just made in [facility] for this patient.      \"This patient is interested in Therapy at [place] or [place]. He has [employer] (they are currently trying to verify this) and he also has [facility].     He is being discharged tomorrow 11/1/24. Please let me know if he can be scheduled for Therapy. (He is not taking medications so he does not need Medication Management)\"     Thank you,      Becca  "

## 2024-10-31 NOTE — TELEPHONE ENCOUNTER
Received [employer] regarding pt being discharged and referred to TT. Writer sent message informing CM, currently there are no appointments available and if CM would like pt to be placed on Waitlist. Will proceed accordingly once response received from CM.
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
## 2024-10-31 NOTE — CASE MANAGEMENT
"CM emailed [facility] Verifiers and UR to inform them that pt reported he has  Insurance through the [facility] from being a [employer] in addition to his civilian [employer] Insurance (which shows no MH coverage). They were going to look into the  and they reported that they did a search and  showed \"ineligible.\"      CM sent ASAP order in [facility] to refer pt to [facility] [place] or [place] for Therapy after dc tomorrow 11/1/24.     CM also sent follow up in basket message to check on status of referral. CM informed them that they are confirming pt's   Insurance and that he also has [employer] and requested response if they can schedule pt for Therapy upon dc.     9:45am   [facility] responded and reported:   \"We unfortunately don't have any Therapy appointments available at this time, we currently have a waitlist, would you like me to place him on the waitlist?\"      CM called [facility] ([phone])  to refer pt for [facility] to see if they can assist pt as it does not appear that he has MH coverage from his insurance. They will send referral to intake department and call pt after dc.         Vanesa from [facility] UR provided update on pt's VA Insurance:   \"Writer called [facility] # [phone] and was connected to Saniya @ [facility]. Saniya was able to find patient in VA portal. I provided all admission criteria, Facility,  date and time of arrival on the behavioral health unit. I provided my email and designated myself as a point of contact with [facility]. Saniya reported this case has to be reviewed by [facility] physician and they would email me outcome once reviewed. Notification number for admission # [id number]. Once I receive outcome I will enter in Epic. UR, SW, and Verifiers notified on phone call to [facility].\"    "
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
## 2024-11-01 VITALS
BODY MASS INDEX: 23.99 KG/M2 | HEIGHT: 65 IN | HEART RATE: 62 BPM | RESPIRATION RATE: 17 BRPM | WEIGHT: 144 LBS | OXYGEN SATURATION: 98 % | DIASTOLIC BLOOD PRESSURE: 62 MMHG | TEMPERATURE: 97.5 F | SYSTOLIC BLOOD PRESSURE: 125 MMHG

## 2024-11-01 PROCEDURE — 99239 HOSP IP/OBS DSCHRG MGMT >30: CPT | Performed by: PSYCHIATRY & NEUROLOGY

## 2024-11-01 NOTE — BH TRANSITION RECORD
Contact Information: If you have any questions, concerns, pended studies, tests and/or procedures, or emergencies regarding your inpatient behavioral health visit. Please contact [place] [facility] [phone] and ask to speak to a , nurse or physician. A contact is available 24 hours/ 7 days a week at this number.     Summary of Procedures Performed During your Stay:  Below is a list of major procedures performed during your hospital stay and a summary of results:  - Cardiac Procedures/Studies: ECG.  Sinus bradycardia  Rightward axis  Borderline ECG    Pending Studies (From admission, onward)      None          Please follow up on the above pending studies with your PCP and/or referring provider.

## 2024-11-01 NOTE — NURSING NOTE
AVS reviewed and prescriptions have been e-prescribed.  Pt expresses understanding of all.  Denies any questions or concerns.    Pt discharged from unit via van transport.

## 2024-11-01 NOTE — CASE MANAGEMENT
Pt reported feeling ready to dc home today. Pt scheduled for transportation for [facility] van at 1pm. Pt provided with hospital admission/dc letter that he can provide to his employer and [employer] if needed.
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
## 2024-11-01 NOTE — PROGRESS NOTES
11/01/24 0754   Team Meeting   Meeting Type Daily Rounds   Team Members Present   Team Members Present Physician;Nurse;;Other (Discipline and Name)   Physician Team Member Dr. Kim / Dr. Shaikh / ROBBIE [place] / [place] Student   Nursing Team Member Feliberto   Care Management Team Member Dai / Mary / Sarah   Other (Discipline and Name) Sigmund - Group Facilitator   Patient/Family Present   Patient Present No   Patient's Family Present No     Treatment Team Rounds Completed  Medical and Psychiatric Review Completed  D/C: Today, van at 1pm. Pt calm, cooperative. Reported mild anxiety. Denies all symptoms.

## 2024-11-01 NOTE — NURSING NOTE
Pt ambulating in hallway on approach. Calm, cooperative and pleasant during interaction. Pt expresses minor anxiety over discharge planning; Reassured by this writer. Pt reports feeling better overall this evening; Denies SI/HI/AVH currently. Plan of care ongoing. Pt denies any other unmet needs or concerns at this time.

## 2024-11-01 NOTE — NURSING NOTE
Pt is pleasant and cooperative on approach.  Expresses readiness for discharge today.  Plan to follow up with seeking OP therapy and feels ready to return to work tomorrow.    Visible on unit, social with peers and attending groups.

## 2024-11-04 NOTE — PROGRESS NOTES
11/01/24 0754   Team Meeting   Meeting Type Daily Rounds   Team Members Present   Team Members Present Physician;;Nurse;Other (Discipline and Name)   Physician Team Member Dr. Kim / Dr. Shaikh / JEYSON Hills / ROBBIE Milan   Nursing Team Member Ariel / Jamaal   Care Management Team Member Dai / Mary / Sarah   Other (Discipline and Name) Sigmund - Group Facilitator   Patient/Family Present   Patient Present No   Patient's Family Present No     DC: Today - van transport at 1pm

## 2025-03-04 ENCOUNTER — HOSPITAL ENCOUNTER (EMERGENCY)
Facility: HOSPITAL | Age: 20
Discharge: HOME | End: 2025-03-04
Attending: EMERGENCY MEDICINE | Admitting: EMERGENCY MEDICINE
Payer: COMMERCIAL

## 2025-03-04 VITALS
RESPIRATION RATE: 20 BRPM | TEMPERATURE: 98 F | SYSTOLIC BLOOD PRESSURE: 117 MMHG | HEART RATE: 69 BPM | OXYGEN SATURATION: 98 % | DIASTOLIC BLOOD PRESSURE: 56 MMHG

## 2025-03-04 DIAGNOSIS — Z20.3 NEED FOR POST EXPOSURE PROPHYLAXIS FOR RABIES: ICD-10-CM

## 2025-03-04 DIAGNOSIS — T14.8XXA ANIMAL BITE: Primary | ICD-10-CM

## 2025-03-04 PROCEDURE — 90675 RABIES VACCINE IM: CPT | Performed by: EMERGENCY MEDICINE

## 2025-03-04 PROCEDURE — 90472 IMMUNIZATION ADMIN EACH ADD: CPT

## 2025-03-04 PROCEDURE — 96372 THER/PROPH/DIAG INJ SC/IM: CPT

## 2025-03-04 PROCEDURE — 90375 RABIES IG IM/SC: CPT | Performed by: EMERGENCY MEDICINE

## 2025-03-04 PROCEDURE — 90715 TDAP VACCINE 7 YRS/> IM: CPT | Performed by: EMERGENCY MEDICINE

## 2025-03-04 PROCEDURE — 90471 IMMUNIZATION ADMIN: CPT

## 2025-03-04 PROCEDURE — 99284 EMERGENCY DEPT VISIT MOD MDM: CPT | Performed by: EMERGENCY MEDICINE

## 2025-03-04 PROCEDURE — 99283 EMERGENCY DEPT VISIT LOW MDM: CPT

## 2025-03-04 RX ADMIN — RABIES VIRUS STRAIN PM-1503-3M ANTIGEN (PROPIOLACTONE INACTIVATED) AND WATER 1 ML: KIT at 14:36

## 2025-03-04 RX ADMIN — AMOXICILLIN AND CLAVULANATE POTASSIUM 1 TABLET: 875; 125 TABLET, FILM COATED ORAL at 14:40

## 2025-03-04 RX ADMIN — RABIES IMMUNE GLOBULIN (HUMAN) 1500 UNITS: 300 INJECTION, SOLUTION INFILTRATION; INTRAMUSCULAR at 14:36

## 2025-03-04 RX ADMIN — TETANUS TOXOID, REDUCED DIPHTHERIA TOXOID AND ACELLULAR PERTUSSIS VACCINE, ADSORBED 0.5 ML: 5; 2.5; 8; 8; 2.5 SUSPENSION INTRAMUSCULAR at 14:37

## 2025-03-04 NOTE — DISCHARGE INSTRUCTIONS
Please follow up PCP.  Given return on day 3 (3/7), 7 (3/11), and 14 (3/18) for repeat rabies vaccine administration to complete full course.  Recommend tylenol 650 mg and ibuprofen 600 mg every 6 hours as needed for pain. Please return for severe chest pain, significant shortness of breath, severely worsening symptoms, or any other concerning signs or symptoms. Please refer to the following documents for additional instructions and return precautions.

## 2025-03-04 NOTE — ED PROVIDER NOTES
Time reflects when diagnosis was documented in both MDM as applicable and the Disposition within this note       Time User Action Codes Description Comment    3/4/2025  2:17 PM Frankie Aiken Add [T14.8XXA] Animal bite     3/4/2025  2:17 PM Frankie Aiken Add [Z20.3] Need for post exposure prophylaxis for rabies           ED Disposition       ED Disposition   Discharge    Condition   Stable    Date/Time   Tue Mar 4, 2025  2:17 PM    Comment   [facility] Du discharge to home/self care.                   Assessment & Plan       Medical Decision Making  19-year-old male no significant reported past history presenting with animal bite.  Will update tetanus.  Plan for rabies vaccine and immunoglobulin.  Will give dose antibiotics.  Prescription sent to pharmacy.  Provided information and advised to return for full rabies course. Discussed results and recommendations. Advised follow up PCP. Medication recommendations. Given instructions and return precautions. Patient/family at bedside acknowledged understanding of all written and verbal instructions and return precautions. Discharged.     Risk  Prescription drug management.             Medications   tetanus-diphtheria-acellular pertussis (BOOSTRIX) IM injection 0.5 mL (0.5 mL Intramuscular Given 3/4/25 1437)   rabies vaccine, human diploid IM injection 1 mL (1 mL Intramuscular Given 3/4/25 1436)   rabies immune globulin, human (HyperRAB) injection 1,500 Units (1,500 Units Infiltration Given 3/4/25 1436)   amoxicillin-clavulanate (AUGMENTIN) 875-125 mg per tablet 1 tablet (1 tablet Oral Given 3/4/25 1440)       ED Risk Strat Scores                            SBIRT 20yo+      Flowsheet Row Most Recent Value   Initial Alcohol Screen: US AUDIT-C     1. How often do you have a drink containing alcohol? 0 Filed at: 03/04/2025 7493   2. How many drinks containing alcohol do you have on a typical day you are drinking?  0 Filed at: 03/04/2025 0737   3a. Male UNDER 65: How often do you  have five or more drinks on one occasion? 0 Filed at: 03/04/2025 1457   Audit-C Score 0 Filed at: 03/04/2025 1455   JANAK: How many times in the past year have you...    Used an illegal drug or used a prescription medication for non-medical reasons? Never Filed at: 03/04/2025 0564                            History of Present Illness       Chief Complaint   Patient presents with    Animal Bite     Pt reports being bit by a squirrel yesterday afternoon on right fifth digit.        History reviewed. No pertinent past medical history.   History reviewed. No pertinent surgical history.   History reviewed. No pertinent family history.   Social History     Tobacco Use    Smoking status: Never    Smokeless tobacco: Never   Vaping Use    Vaping status: Never Used   Substance Use Topics    Alcohol use: Never    Drug use: Never      E-Cigarette/Vaping    E-Cigarette Use Never User       E-Cigarette/Vaping Substances    Nicotine No     THC No     CBD No     Flavoring No     Other No     Unknown No       I have reviewed and agree with the history as documented.     19-year-old male no significant reported past history presenting with animal bite.  Patient reports being bit by a squirrel on right lateral thumb yesterday.  Reports mild soreness and bruising.  Denies any erythema fluctuance induration.  Denies any other pain or injury.  Unsure of last tetanus.  Has not previously been vaccinated for rabies.  Denies any other complaints.  Chart reviewed.    History reviewed. No pertinent past medical history.  Family History: non-contributory  Social History          Review of Systems   Constitutional:  Negative for appetite change, chills, diaphoresis, fever and unexpected weight change.   HENT:  Negative for congestion and rhinorrhea.    Eyes:  Negative for photophobia and visual disturbance.   Respiratory:  Negative for cough, chest tightness and shortness of breath.    Cardiovascular:  Negative for chest pain, palpitations and  leg swelling.   Gastrointestinal:  Negative for abdominal distention, abdominal pain, blood in stool, constipation, diarrhea, nausea and vomiting.   Genitourinary:  Negative for dysuria and hematuria.   Musculoskeletal:  Negative for back pain, joint swelling, neck pain and neck stiffness.   Skin:  Positive for wound. Negative for color change, pallor and rash.   Neurological:  Negative for dizziness, syncope, weakness, light-headedness and headaches.   Psychiatric/Behavioral:  Negative for agitation.    All other systems reviewed and are negative.          Objective       ED Triage Vitals [03/04/25 1338]   Temperature Pulse Blood Pressure Respirations SpO2 Patient Position - Orthostatic VS   98 °F (36.7 °C) 69 117/56 20 98 % Sitting      Temp Source Heart Rate Source BP Location FiO2 (%) Pain Score    Temporal Monitor Left arm -- --      Vitals      Date and Time Temp Pulse SpO2 Resp BP Pain Score FACES Pain Rating User   03/04/25 1338 98 °F (36.7 °C) 69 98 % 20 117/56 -- -- GP            Physical Exam  Vitals and nursing note reviewed.   Constitutional:       General: He is not in acute distress.     Appearance: Normal appearance. He is well-developed. He is not ill-appearing, toxic-appearing or diaphoretic.   HENT:      Head: Normocephalic and atraumatic.      Nose: Nose normal. No congestion or rhinorrhea.      Mouth/Throat:      Mouth: Mucous membranes are moist.      Pharynx: Oropharynx is clear. No oropharyngeal exudate or posterior oropharyngeal erythema.   Eyes:      General: No scleral icterus.        Right eye: No discharge.         Left eye: No discharge.      Extraocular Movements: Extraocular movements intact.      Conjunctiva/sclera: Conjunctivae normal.      Pupils: Pupils are equal, round, and reactive to light.   Neck:      Vascular: No JVD.      Trachea: No tracheal deviation.      Comments: Supple. Normal range of motion.   Cardiovascular:      Rate and Rhythm: Normal rate and regular rhythm.       Heart sounds: Normal heart sounds. No murmur heard.     No friction rub. No gallop.      Comments: Normal rate and regular rhythm  Pulmonary:      Effort: Pulmonary effort is normal. No respiratory distress.      Breath sounds: Normal breath sounds. No stridor. No wheezing or rales.      Comments: Clear to auscultation bilaterally  Chest:      Chest wall: No tenderness.   Abdominal:      General: Bowel sounds are normal. There is no distension.      Palpations: Abdomen is soft.      Tenderness: There is no abdominal tenderness. There is no right CVA tenderness, left CVA tenderness, guarding or rebound.      Comments: Soft, nontender, nondistended.  Normal bowel sounds throughout   Musculoskeletal:         General: No swelling, tenderness, deformity or signs of injury. Normal range of motion.      Cervical back: Normal range of motion and neck supple. No rigidity. No muscular tenderness.      Right lower leg: No edema.      Left lower leg: No edema.   Lymphadenopathy:      Cervical: No cervical adenopathy.   Skin:     General: Skin is warm and dry.      Coloration: Skin is not pale.      Findings: Bruising and lesion present. No erythema or rash.      Comments: Small puncture wounds to right lateral thigh.  No erythema, fluctuance, induration.  No drainage.  Mild bruising.  Mild tenderness   Neurological:      General: No focal deficit present.      Mental Status: He is alert. Mental status is at baseline.      Sensory: No sensory deficit.      Motor: No weakness or abnormal muscle tone.      Coordination: Coordination normal.      Gait: Gait normal.      Comments: Alert.  Strength and sensation grossly intact.  Ambulatory without difficulty at baseline.    Psychiatric:         Behavior: Behavior normal.         Thought Content: Thought content normal.         Results Reviewed       None            No orders to display       Procedures    ED Medication and Procedure Management   None     Discharge Medication List as  of 3/4/2025  2:20 PM        START taking these medications    Details   amoxicillin-clavulanate (AUGMENTIN) 875-125 mg per tablet Take 1 tablet by mouth every 12 (twelve) hours for 7 days, Starting Tue 3/4/2025, Until Tue 3/11/2025, Normal           No discharge procedures on file.  ED SEPSIS DOCUMENTATION   Time reflects when diagnosis was documented in both MDM as applicable and the Disposition within this note       Time User Action Codes Description Comment    3/4/2025  2:17 PM Frankie Aiken [T14.8XXA] Animal bite     3/4/2025  2:17 PM Frankie Aiken [Z20.3] Need for post exposure prophylaxis for rabies                  Frankie Aiken MD  03/04/25 1525